# Patient Record
Sex: FEMALE | URBAN - METROPOLITAN AREA
[De-identification: names, ages, dates, MRNs, and addresses within clinical notes are randomized per-mention and may not be internally consistent; named-entity substitution may affect disease eponyms.]

---

## 2020-04-06 ENCOUNTER — VIRTUAL VISIT (OUTPATIENT)
Dept: FAMILY MEDICINE | Facility: OTHER | Age: 30
End: 2020-04-06

## 2020-04-07 NOTE — PROGRESS NOTES
"Date: 2020 15:51:46  Clinician: Dane Juares  Clinician NPI: [id number]  Patient: Belinda Castañeda  Patient : 1990  Patient Address: [address] [place] [address]  Patient Phone: [phone]  Visit Protocol: URI  Patient Summary:  Belinda is a 30 year old ( : 1990 ) female who initiated a Visit for cold, sinus infection, or influenza. When asked the question \"Please sign me up to receive news, health information and promotions. \", Belinda responded \"No\".    Belinda states her symptoms started gradually 7-9 days ago.   Her symptoms consist of rhinitis, facial pain or pressure, myalgia, a sore throat, a cough, nasal congestion, malaise, chills, diarrhea, and a headache. Belinda also feels feverish.   Symptom details     Nasal secretions: The color of her mucus is clear.    Cough: Belinda coughs a few times an hour and her cough is not more bothersome at night. Phlegm comes into her throat when she coughs. She believes her cough is caused by post-nasal drip. The color of the phlegm is clear.     Sore throat: Belinda reports having severe throat pain (7-9 on a 10 point pain scale), does not have exudate on her tonsils, and can swallow liquids. The lymph nodes in her neck are not enlarged. A rash has not appeared on the skin since the sore throat started.     Temperature: Her current temperature is 102.9 degrees Fahrenheit. Belinda has had a temperature over 100 degrees Fahrenheit for 5-7 days.     Facial pain or pressure: The facial pain or pressure does not feel worse when bending or leaning forward.     Headache: She states the headache is severe (7-9 on a 10 point pain scale).      Belinda denies having ear pain, enlarged lymph nodes, vomiting, nausea, teeth pain, and wheezing. She also denies taking antibiotic medication for the symptoms, having recent facial or sinus surgery in the past 60 days, having a sinus infection within the past year, and double sickening (worsening symptoms after initial " improvement). She is not experiencing dyspnea.   Precipitating events  Within the past week, Belinda has not been exposed to someone with strep throat. She has not recently been exposed to someone with influenza. Belinda has not been in close contact with any high risk individuals.   Pertinent COVID-19 (Coronavirus) information  Belinda has not traveled internationally or to the areas where COVID-19 (Coronavirus) is widespread, including cruise ship travel in the last 14 days before the start of her symptoms.   Belinda has not had a close contact with a laboratory-confirmed COVID-19 patient within 14 days of symptom onset. She also has not had a close contact with a suspected COVID-19 patient within 14 days of symptom onset.   Belinda does not work or volunteer as healthcare worker or a  and does not work or volunteer in a healthcare facility. She does not live with a healthcare worker.   Triage Point(s) temporarily suspended for COVID-19 (Coronavirus) screening  Belinda reported the following symptoms which were previously protocol referral points. These protocol referral points have temporarily been removed for purposes of COVID-19 (Coronavirus) screening.   Temperature &gt; 102. Current temperature: 102.9    Pertinent medical history  Belinda typically gets a yeast infection when she takes antibiotics. She has used fluconazole (Diflucan) to treat previous yeast infections. 1 dose of fluconazole (Diflucan) has typically been sufficient for symptoms to resolve in the past.   Belinda needs a return to work/school note.   Weight: 305 lbs   Belinda does not smoke or use smokeless tobacco.   She denies pregnancy and denies breastfeeding. She does not menstruate.   Weight: 305 lbs    MEDICATIONS: No current medications, ALLERGIES: NKDA  Clinician Response:  Dear Belinda,  Based on the information provided, you have acute bacterial sinusitis, also known as a sinus infection. Sinus infections are caused by bacteria or a virus and  symptoms are almost always identical. The difference between the 2 types of infections is timing.  Sinus infections start as viral infections and symptoms improve on their own in about 7 days. If symptoms have not improved after 7 days or have even worsened, a bacterial infection may have developed.  Medication information  I am prescribing:       Fluticasone 50 mcg/actuation nasal spray. Inhale 2 sprays in each nostril 1 time per day; after 1 week, may adjust to 1 - 2 sprays in each nostril 1 time per day. This medication takes several days to start working, so keep taking it even if it doesn't help right away. There are no refills with this prescription.      Amoxicillin-pot clavulanate 875-125 mg oral tablet. Take 1 tablet by mouth every 12 hours for 10 days. There are no refills with this prescription.     Because you usually get a yeast infection when taking antibiotics, I am also prescribing:     Fluconazole (Diflucan) 150 mg oral tablet. Take 1 tablet by mouth 1 time a day for 1 day. There are no refills with this prescription.   If you become pregnant during this course of treatment, stop taking the medication and contact your primary care provider.  Unless you are allergic to the over-the-counter medication(s) below, I recommend using:       Acetaminophen (Tylenol or store brand) oral tablet. Take 1-2 tablets by mouth every 4-6 hours to help with the discomfort.    A decongestant such as Sudafed PE or store brand.     Over-the-counter medications do not require a prescription. Ask the pharmacist if you have any questions.  Self care  Steps you can take to be as comfortable as possible:     Rest.    Drink plenty of fluids.    Take a warm shower to loosen congestion    Use a cool-mist humidifier.    Use throat lozenges.    Suck on frozen items such as popsicles.    Drink hot tea with lemon and honey.    Gargle with warm salt water (1/4 teaspoon of salt per 8 ounce glass of water).    Take a spoonful of honey  to reduce your cough.     When to seek care  Please be seen in a clinic or urgent care if any of the following occur:     New symptoms develop, or symptoms become worse    Symptoms do not start to improve after 3 days of treatment     Call ahead before going to the clinic or urgent care.  It is possible to have an allergic reaction to an antibiotic even if you have not had one in the past. If you notice a new rash, significant swelling, or difficulty breathing, stop taking this medication immediately and go to a clinic or urgent care.  Call 911 or go to the emergency room if you feel that your throat is closing off, you suddenly develop a rash, you are unable to swallow fluids, you are drooling, or you are having difficulty breathing.  COVID-19 (Coronavirus) General Information  With the increase in the number of COVID-19 (Coronavirus) cases, we understand you may have some questions. Below is some helpful information on COVID-19 (Coronavirus).  How can I protect myself and others from the COVID-19 (Coronavirus)?  Because there is currently no vaccine to prevent infection, the best way to protect yourself is to avoid being exposed to this virus. Put distance between yourself and other people if COVID-19 (Coronavirus) is spreading in your community. The virus is thought to spread mainly from person-to-person.     Between people who are in close contact with one another (within about 6 about) for a prolonged period (10 minutes or longer).    Through respiratory droplets produced when an infected person coughs or sneezes.     The CDC recommends the following additional steps to protect yourself and others:     Wash your hands often with soap and water for at least 20 seconds, especially after blowing your nose, coughing, or sneezing; going to the bathroom; and before eating or preparing food.  Use an alcohol-based hand  that contains at least 60 percent alcohol if soap and water are not available.        Avoid  touching your eyes, nose and mouth with unwashed hands.    Avoid close contact with people who are sick.    Stay home when you are sick.    Cover your cough or sneeze with a tissue, then throw the tissue in the trash.    Clean and disinfect frequently touched objects and surfaces.     You can help stop COVID-19 (Coronavirus) by knowing the signs and symptoms:     Fever    Cough    Shortness of breath     Contact your healthcare provider if   Develop symptoms   AND   Have been in close contact with a person known to have COVID-19 (Coronavirus) or live in or have recently traveled from an area with ongoing spread of COVID-19 (Coronavirus). Call ahead before you go to a doctor's office or emergency room. Tell them about your recent travel and your symptoms.   For the most up to date information, visit the CDC's website.  Self-monitoring  Self-monitoring means people should monitor themselves for fever by taking their temperatures twice a day and remain alert for a cough or difficulty breathing.  It is important to check your health two times each day for 14 days after a potential exposure to a person with COVID-19 (Coronavirus) or after travel from a location where COVID-19 (Coronavirus) is widespread. If you have been exposed to a person with COVID-19 (Coronavirus), it may take up to 14 days to know if you will get sick. Follow the steps below to check and record your health.     Take your temperature with a thermometer twice a day, once in the morning and once in the evening, and watch for a cough or difficulty breathing for 14 days.    Write down your temperature and any COVID-19 symptoms you may have: feeling feverish, coughing, or difficulty breathing.    Stay home from work or school.    Do not take public transportation, taxis, or ride-shares.    Avoid crowded places (such as shopping centers and movie theaters) and limit your activities in public.    Keep your distance from others (about 6 feet or 2 meters).     If you get sick with fever, cough, or trouble breathing, contact your healthcare provider and tell them about your recent travel and/or your symptoms.    If you need to seek medical care for other reasons, such as dialysis, call ahead to your doctor and tell them about your recent travel.     Steps to help prevent the spread of COVID-19 (Coronavirus) if you are sick  If you are sick with COVID-19 (Coronavirus) or suspect you are infected with the virus that causes COVID-19 (Coronavirus), follow the steps below to help prevent the disease from spreading&nbsp;to people in your home and community.     Stay home except to get medical care. Home isolation may be started in consultation with your healthcare clinician.    Separate yourself from other people and animals in your home.    Call ahead before visiting your doctor if you have a medical appointment.    Wear a facemask when you are around other people.    Cover your cough and sneezes.    Clean your hands often.    Avoid sharing personal household items.    Clean and disinfect frequently touched objects and surfaces everyday.    You will need to have someone drop off medications or household supplies (if needed) at your house without coming inside or in contact with you or others living in your house.    Monitor your symptoms and seek prompt medical care if your illness is worsening (e.g. Difficulty breathing).    Discontinue home isolation only in consultation with your healthcare provider.     For more detailed and up to date information on what to do if you are sick, visit this link: What to Do If You Are Sick With COVID-19.  Do I need to be tested for COVID-19 (Coronavirus)?     Not everyone needs to be tested for COVID-19 (Coronavirus). Decisions on which patients receive testing will be based on the local spread of COVID-19 (Coronavirus) as well as the symptoms. Your healthcare provider will make the final decision on whether you should be tested.    In the  "meantime, if you have concerns that you may have been exposed, it is reasonable to practice \"social distancing.\"&nbsp; If you are ill with a cold or flu-like illness, please monitor your symptoms and call your healthcare provider if your symptoms worsen.    For more up to date information, visit this link: COVID-19 (Coronavirus) Frequently Asked Questions and Answers.      Diagnosis: Acute bacterial sinusitis  Diagnosis ICD: J01.90  Prescription: fluticasone 50 mcg/actuation nasal spray,suspension 1 120 spray aerosol with adapter (grams), 30 days supply. Inhale 2 sprays in each nostril 1 time per day; after 1 week, may adjust to 1 - 2 sprays in each nostril 1 time per day.. Refills: 0, Refill as needed: no, Allow substitutions: yes  Prescription: amoxicillin-pot clavulanate 875-125 mg oral tablet 20 tablet, 10 days supply. Take 1 tablet by mouth every 12 hours for 10 days. Refills: 0, Refill as needed: no, Allow substitutions: yes  Prescription: fluconazole (Diflucan) 150 mg oral tablet 1 tablet, 1 days supply. Take 1 tablet by mouth 1 time per day for 1 day. Refills: 0, Refill as needed: no, Allow substitutions: yes  Pharmacy: [id number] - [phone] - [address] [place] [address]  "

## 2020-08-09 ENCOUNTER — VIRTUAL VISIT (OUTPATIENT)
Dept: FAMILY MEDICINE | Facility: OTHER | Age: 30
End: 2020-08-09

## 2020-08-10 NOTE — PROGRESS NOTES
"Date: 2020 21:29:23  Clinician: Keri Navarro  Clinician NPI: [id number]  Patient: Belinda Castañeda  Patient : 1990  Patient Address: [address] [place] [address]  Patient Phone: [phone]  Visit Protocol: URI  Patient Summary:  Belinda is a 30 year old ( : 1990 ) female who initiated a Visit for cold, sinus infection, or influenza. When asked the question \"Please sign me up to receive news, health information and promotions. \", Belinda responded \"No\".    Belinda states her symptoms started suddenly 10-13 days ago. After her symptoms started, they improved and then got worse again.   Her symptoms consist of a headache, myalgia, facial pain or pressure, and malaise.   Symptom details     Facial pain or pressure: The facial pain or pressure does not feel worse when bending or leaning forward.     Headache: She states the headache is mild (1-3 on a 10 point pain scale).      Belinda denies having ear pain, chills, enlarged lymph nodes, nausea, sore throat, teeth pain, ageusia, diarrhea, cough, nasal congestion, vomiting, rhinitis, anosmia, fever, and wheezing. She also denies having a sinus infection within the past year, taking antibiotic medication in the past month, and having recent facial or sinus surgery in the past 60 days. She is not experiencing dyspnea.   Precipitating events  She has not recently been exposed to someone with influenza. Belinda has not been in close contact with any high risk individuals.   Pertinent COVID-19 (Coronavirus) information  In the past 14 days, Belinda has not worked in a congregate living setting.   She does not work or volunteer as healthcare worker or a  and does not work or volunteer in a healthcare facility.   Belinda also has not lived in a congregate living setting in the past 14 days. She does not live with a healthcare worker.   Belinda has not had a close contact with a laboratory-confirmed COVID-19 patient within 14 days of symptom onset.   " Since December 2019, Belinda and has not had upper respiratory infection or influenza-like illness. Has not been diagnosed with lab-confirmed COVID-19 test   Pertinent medical history  Belinda does not get yeast infections when she takes antibiotics.   Belinda needs a return to work/school note.   Weight: 305 lbs   Belinda does not smoke or use smokeless tobacco.   She denies pregnancy and denies breastfeeding. She does not menstruate.   Weight: 305 lbs    MEDICATIONS: No current medications, ALLERGIES: NKDA  Clinician Response:  Dear Belinda,   Your symptoms show that you may have coronavirus (COVID-19). This illness can cause fever, cough and trouble breathing. Many people get a mild case and get better on their own. Some people can get very sick.  What should I do?  We would like to test you for this virus.   1. Please call [phone] to schedule your visit. Explain that you were referred by [facility] to have a COVID-19 test. Be [facility].  The following will serve as your written order for this COVID Test, ordered by me, for the indication of suspected COVID [Z20.828]: The test will be ordered in [facility] [facility], our electronic health record, after you are scheduled. It will show as ordered and authorized by Albert Silva MD.  Order: COVID-19 (Coronavirus) PCR for SYMPTOMATIC testing from [facility].      2. When it's time for your COVID test:  Stay at least 6 feet away from others. (If someone will drive you to your test, stay in the backseat, as far away from the  as you can.)   Cover your mouth and nose with a mask, tissue or washcloth.  Go straight to the testing site. Don't make any stops on the way there or back.      3.Starting now: Stay home and away from others (self-isolate) until:   You've had no fever---and no medicine that reduces fever---for one full day (24 hours). And...   Your other symptoms have gotten better. For example, your cough or breathing has improved. And...   At least 10 days  "have passed since your symptoms started.       During this time, don't leave the house except for testing or medical care.   Stay in your own room, even for meals. Use your own bathroom if you can.   Stay away from others in your home. No hugging, kissing or shaking hands. No visitors.  Don't go to work, school or anywhere else.    Clean \"high touch\" surfaces often (doorknobs, counters, handles, etc.). Use a household cleaning spray or wipes. You'll find a full list of  on the EPA website: www.epa.gov/pesticide-registration/list-n-disinfectants-use-against-sars-cov-2.   Cover your mouth and nose with a mask, tissue or washcloth to avoid spreading germs.  Wash your hands and face often. Use soap and water.  Caregivers in these groups are at risk for severe illness due to COVID-19:  o People 65 years and older  o People who live in a nursing home or long-term care facility  o People with chronic disease (lung, heart, cancer, diabetes, kidney, liver, immunologic)  o People who have a weakened immune system, including those who:   Are in cancer treatment  Take medicine that weakens the immune system, such as corticosteroids  Had a bone marrow or organ transplant  Have an immune deficiency  Have poorly controlled HIV or AIDS  Are obese (body mass index of 40 or higher)  Smoke regularly   o Caregivers should wear gloves while washing dishes, handling laundry and cleaning bedrooms and bathrooms.  o Use caution when washing and drying laundry: Don't shake dirty laundry, and use the warmest water setting that you can.  o For more tips, go to www.cdc.gov/coronavirus/2019-ncov/downloads/10Things.pdf.    4.Sign up for [employer]. We know it's scary to hear that you might have COVID-19. We want to track your symptoms to make sure you're okay over the next 2 weeks. Please look for an email from Cornelio Arriola---this is a free, online program that we'll use to keep in touch. To sign up, follow the link in the email. Learn " more at [email or web address]  How can I take care of myself?   Get lots of rest. Drink extra fluids (unless a doctor has told you not to).   Take Tylenol (acetaminophen) for fever or pain. If you have liver or kidney problems, ask your family doctor if it's okay to take Tylenol.   Adults can take either:    650 mg (two 325 mg pills) every 4 to 6 hours, or...   1,000 mg (two 500 mg pills) every 8 hours as needed.    Note: Don't take more than 3,000 mg in one day. Acetaminophen is found in many medicines (both prescribed and over-the-counter medicines). Read all labels to be sure you don't take too much.   For children, check the Tylenol bottle for the right dose. The dose is based on the child's age or weight.    If you have other health problems (like cancer, heart failure, an organ transplant or severe kidney disease): Call your specialty clinic if you don't feel better in the next 2 days.       Know when to call 911. Emergency warning signs include:    Trouble breathing or shortness of breath Pain or pressure in the chest that doesn't go away Feeling confused like you haven't felt before, or not being able to wake up Bluish-colored lips or face.  Where can I get more information?   [facility] -- About COVID-19: [email or web address]   CDC -- What to Do If You're Sick: www.cdc.gov/coronavirus/2019-ncov/about/steps-when-sick.html   CDC -- Ending Home Isolation: www.cdc.gov/coronavirus/2019-ncov/hcp/disposition-in-home-patients.html   CDC -- Caring for Someone: www.cdc.gov/coronavirus/2019-ncov/if-you-are-sick/care-for-someone.html   [facility] -- Interim Guidance for Hospital Discharge to Home: www.health.[facility].[place]/diseases/coronavirus/hcp/hospdischarge.pdf   [facility] clinical trials (COVID-19 research studies): clinicalaffairs.[facility]/n-clinical-trials    Below are the COVID-19 hotlines at the [place] Department of [facility] ([facility]). Interpreters are available.    [facility]  questions: Call 248-343-7398 or 1-752.350.8928 (7 a.m. to 7 p.m.) For questions about schools and childcare: Call 023-725-4386 or 1-178.421.3596 (7 a.m. to 7 p.m.)    Diagnosis: Acute upper respiratory infection, unspecified  Diagnosis ICD: J06.9

## 2020-09-07 ENCOUNTER — VIRTUAL VISIT (OUTPATIENT)
Dept: FAMILY MEDICINE | Facility: OTHER | Age: 30
End: 2020-09-07

## 2020-09-08 ENCOUNTER — VIRTUAL VISIT (OUTPATIENT)
Dept: FAMILY MEDICINE | Facility: OTHER | Age: 30
End: 2020-09-08

## 2020-09-08 NOTE — PROGRESS NOTES
"Date: 2020 18:35:12  Clinician: Didier Mims  Clinician NPI: [id number]  Patient: Belinda Castaeñda  Patient : 1957  Patient Address: [address] [place], [place] [address]  Patient Phone: [phone]  Visit Protocol: URI  Patient Summary:  Belinda is a 62 year old ( : 1957 ) female who initiated a Visit for cold, sinus infection, or influenza. When asked the question \"Please sign me up to receive news, health information and promotions. \", Belinda responded \"No\".    Belinda states her symptoms started suddenly 7-9 days ago.   Her symptoms consist of facial pain or pressure, nasal congestion, a headache, malaise, and myalgia. Belinda also feels feverish.   Symptom details     Nasal secretions: The color of her mucus is clear.    Temperature: Her current temperature is 101 degrees Fahrenheit. Belinda has had a temperature over 100 degrees Fahrenheit for 1-2 days.     Facial pain or pressure: The facial pain or pressure feels worse when bending over or leaning forward.     Headache: She states the headache is moderate (4-6 on a 10 point pain scale).      Belinda denies having ear pain, anosmia, vomiting, rhinitis, nausea, wheezing, sore throat, teeth pain, ageusia, diarrhea, cough, chills, and enlarged lymph nodes. She also denies taking antibiotic medication in the past month, having recent facial or sinus surgery in the past 60 days, double sickening (worsening symptoms after initial improvement), and having a sinus infection within the past year. She is not experiencing dyspnea.   Precipitating events  She has not recently been exposed to someone with influenza. Belinda has not been in close contact with any high risk individuals.   Pertinent COVID-19 (Coronavirus) information  In the past 14 days, Belinda has not worked in a congregate living setting.   She does not work or volunteer as healthcare worker or a  and does not work or volunteer in a healthcare facility.   Belinda also has not lived in a congregate " living setting in the past 14 days. She does not live with a healthcare worker.   Belinda has not had a close contact with a laboratory-confirmed COVID-19 patient within 14 days of symptom onset.   Since December 2019, Belinda and has not had upper respiratory infection or influenza-like illness. Has not been diagnosed with lab-confirmed COVID-19 test   Pertinent medical history  Belinda does not get yeast infections when she takes antibiotics.   Belinda does not need a return to work/school note.   Weight: 280 lbs   Belinda does not smoke or use smokeless tobacco.   Weight: 280 lbs    MEDICATIONS: No current medications, ALLERGIES: NKDA  Clinician Response:  Dear Belinda,   Your symptoms show that you may have coronavirus (COVID-19). This illness can cause fever, cough and trouble breathing. Many people get a mild case and get better on their own. Some people can get very sick.  What should I do?  We would like to test you for this virus.   1. Please call [phone] to schedule your visit. Explain that you were referred by [facility] to have a COVID-19 test. Be ready to share your [facility] visit ID number.  The following will serve as your written order for this COVID Test, ordered by me, for the indication of suspected COVID [Z20.828]: The test will be ordered in [employer], our electronic health record, after you are scheduled. It will show as ordered and authorized by Albert Silva MD.  Order: COVID-19 (Coronavirus) PCR for SYMPTOMATIC testing from [facility].      2. When it's time for your COVID test:  Stay at least 6 feet away from others. (If someone will drive you to your test, stay in the backseat, as far away from the  as you can.)   Cover your mouth and nose with a mask, tissue or washcloth.  Go straight to the testing site. Don't make any stops on the way there or back.      3.Starting now: Stay home and away from others (self-isolate) until:   You've had no fever---and no medicine that reduces fever---for one full day (24 hours).  "And...   Your other symptoms have gotten better. For example, your cough or breathing has improved. And...   At least 10 days have passed since your symptoms started.       During this time, don't leave the house except for testing or medical care.   Stay in your own room, even for meals. Use your own bathroom if you can.   Stay away from others in your home. No hugging, kissing or shaking hands. No visitors.  Don't go to work, school or anywhere else.    Clean \"high touch\" surfaces often (doorknobs, counters, handles, etc.). Use a household cleaning spray or wipes. You'll find a full list of  on the EPA website: www.epa.gov/pesticide-registration/list-n-disinfectants-use-against-sars-cov-2.   Cover your mouth and nose with a mask, tissue or washcloth to avoid spreading germs.  Wash your hands and face often. Use soap and water.  Caregivers in these groups are at risk for severe illness due to COVID-19:  o People 65 years and older  o People who live in a nursing home or long-term care facility  o People with chronic disease (lung, heart, cancer, diabetes, kidney, liver, immunologic)  o People who have a weakened immune system, including those who:   Are in cancer treatment  Take medicine that weakens the immune system, such as corticosteroids  Had a bone marrow or organ transplant  Have an immune deficiency  Have poorly controlled HIV or AIDS  Are obese (body mass index of 40 or higher)  Smoke regularly   o Caregivers should wear gloves while washing dishes, handling laundry and cleaning bedrooms and bathrooms.  o Use caution when washing and drying laundry: Don't shake dirty laundry, and use the warmest water setting that you can.  o For more tips, go to www.cdc.gov/coronavirus/2019-ncov/downloads/10Things.pdf.    4.Sign up for GetWell Loop. We know it's scary to hear that you might have COVID-19. We want to track your symptoms to make sure you're okay over the next 2 weeks. Please look for an email from " Cornelio Arriola---this is a free, online program that we'll use to keep in touch. To sign up, follow the link in the email. Learn more at [email or web address]  How can I take care of myself?   Get lots of rest. Drink extra fluids (unless a doctor has told you not to).   Take Tylenol (acetaminophen) for fever or pain. If you have liver or kidney problems, ask your family doctor if it's okay to take Tylenol.   Adults can take either:    650 mg (two 325 mg pills) every 4 to 6 hours, or...   1,000 mg (two 500 mg pills) every 8 hours as needed.    Note: Don't take more than 3,000 mg in one day. Acetaminophen is found in many medicines (both prescribed and over-the-counter medicines). Read all labels to be sure you don't take too much.   For children, check the Tylenol bottle for the right dose. The dose is based on the child's age or weight.    If you have other health problems (like cancer, heart failure, an organ transplant or severe kidney disease): Call your specialty clinic if you don't feel better in the next 2 days.       Know when to call 911. Emergency warning signs include:    Trouble breathing or shortness of breath Pain or pressure in the chest that doesn't go away Feeling confused like you haven't felt before, or not being able to wake up Bluish-colored lips or face.  Where can I get more information?   [facility] -- About COVID-19: [email or web address]   CDC -- What to Do If You're Sick: www.cdc.gov/coronavirus/2019-ncov/about/steps-when-sick.html   CDC -- Ending Home Isolation: www.cdc.gov/coronavirus/2019-ncov/hcp/disposition-in-home-patients.html   CDC -- Caring for Someone: www.cdc.gov/coronavirus/2019-ncov/if-you-are-sick/care-for-someone.html   [facility] -- Interim Guidance for Hospital Discharge to Home: www.health.[facility].mn.us/diseases/coronavirus/hcp/hospdischarge.pdf   [facility] clinical trials (COVID-19 research studies):  clinicalaffairs.[facility]-clinical-trials    Below are the COVID-19 hotlines at the [place] Department of Health ([facility]). Interpreters are available.    For health questions: Call 390-011-0732 or 1-583.880.1593 (7 a.m. to 7 p.m.) For questions about schools and childcare: Call 935-650-6998 or 1-842.132.2776 (7 a.m. to 7 p.m.)    Diagnosis: Acute upper respiratory infection, unspecified  Diagnosis ICD: J06.9  Additional Clinician Notes:   If your symptoms are not improving or worsen, please go to one of our urgent care locations for evaluation.

## 2020-09-08 NOTE — PROGRESS NOTES
"Date: 2020 21:43:13  Clinician: José Schulz  Clinician NPI: [id number]  Patient: Belinda Castañeda  Patient : 1990  Patient Address: [address] [place] [address]  Patient Phone: [phone]  Visit Protocol: URI  Patient Summary:  Belinda is a 30 year old ( : 1990 ) female who initiated a Visit for cold, sinus infection, or influenza. When asked the question \"Please sign me up to receive news, health information and promotions. \", Belinda responded \"No\".    Belinda states her symptoms started gradually 7-9 days ago.   Her symptoms consist of facial pain or pressure, a cough, nasal congestion, a headache, malaise, and myalgia. Belinda also feels feverish.   Symptom details     Nasal secretions: The color of her mucus is clear.    Cough: Belinda coughs a few times an hour and her cough is more bothersome at night. Phlegm does not come into her throat when she coughs. She does not believe her cough is caused by post-nasal drip.     Temperature: Her current temperature is 100 degrees Fahrenheit.     Facial pain or pressure: The facial pain or pressure does not feel worse when bending or leaning forward.     Headache: She states the headache is moderate (4-6 on a 10 point pain scale).      Belinda denies having ear pain, anosmia, vomiting, rhinitis, nausea, wheezing, sore throat, teeth pain, ageusia, diarrhea, chills, and enlarged lymph nodes. She also denies taking antibiotic medication in the past month, having recent facial or sinus surgery in the past 60 days, double sickening (worsening symptoms after initial improvement), and having a sinus infection within the past year. She is not experiencing dyspnea.   Precipitating events  She has not recently been exposed to someone with influenza. Belinda has not been in close contact with any high risk individuals.   Pertinent COVID-19 (Coronavirus) information  In the past 14 days, Belinda has not worked in a congregate living setting.   She does not work or " volunteer as healthcare worker or a  and does not work or volunteer in a healthcare facility.   Belinda also has not lived in a congregate living setting in the past 14 days. She does not live with a healthcare worker.   Belinda has not had a close contact with a laboratory-confirmed COVID-19 patient within 14 days of symptom onset.   Since December 2019, Belinda and has not had upper respiratory infection or influenza-like illness. Has not been diagnosed with lab-confirmed COVID-19 test   Pertinent medical history  Belinda does not get yeast infections when she takes antibiotics.   Belinda does not need a return to work/school note.   Weight: 280 lbs   Belinda does not smoke or use smokeless tobacco.   She denies pregnancy and denies breastfeeding. She does not menstruate.   Weight: 280 lbs    MEDICATIONS: No current medications, ALLERGIES: NKDA  Clinician Response:  Dear Belinda,  Based on the information provided, you have acute bacterial sinusitis, also known as a sinus infection. Sinus infections are caused by bacteria or a virus and symptoms are almost always identical. The difference between the 2 types of infections is timing.  Sinus infections start as viral infections and symptoms improve on their own in about 7 days. If symptoms have not improved after 7 days or have even worsened, a bacterial infection may have developed.  Medication information  I am prescribing:       Fluticasone 50 mcg/actuation nasal spray. Inhale 2 sprays in each nostril 1 time per day; after 1 week, may adjust to 1 - 2 sprays in each nostril 1 time per day. This medication takes several days to start working, so keep taking it even if it doesn't help right away. There are no refills with this prescription.      Ibuprofen 800 mg oral tablet. Take 1 tablet by mouth 3 times per day as needed for 7 days. Do not exceed 2400mg in 24 hours. There are no refills with this prescription.      Doxycycline hyclate 100 mg oral tablet. Take 1 tablet  by mouth 2 times per day for 7 days. There are no refills with this prescription.      Benzonatate (Tessalon Perles) 100 mg oral capsule. Take 1-2 capsules by mouth 3 times per day as needed for your cough. There are no refills with this prescription.     Certain antibiotics such as Doxycycline, levofloxacin, and moxifloxacin can cause your skin to be more sensitive to the sun. Exposure to the sun when taking these antibiotics may cause skin rash, itching, redness, or a severe sunburn. Be sure to avoid prolonged or direct exposure to the sun, especially between 10 am and 3 pm, if possible. Wear protective clothing and use sunscreen of SPF 30 or higher when you are outdoors.  Yeast infections can be a common side effect of antibiotics. The most common symptom of a yeast infection is itchiness in and around the vagina. Other signs and symptoms include burning, redness, or a thick, white vaginal discharge that looks like cottage cheese and does not have a bad smell.  If you become pregnant during this course of treatment, stop taking the medication and contact your primary care provider.  Unless you are allergic to the over-the-counter medication(s) below, I recommend using:       Acetaminophen (Tylenol or store brand) oral tablet. Take 1-2 tablets by mouth every 4-6 hours to help with the discomfort.      Ibuprofen (Advil or store brand) 200 mg oral tablet. Take 1-3 tablets (200-600 mg) by mouth every 8 hours to help with the discomfort. Make sure to take the ibuprofen with food. Do not exceed 2400 mg in 24 hours.      Guaifenesin + dextromethorphan (Robitussin DM, Mucinex DM, or store brand).    A sinus irrigation kit such as Sinus Rinse, Neti Pot, SinuCleanse, or store brand. Be sure to use sterile or previously boiled water to prevent unwanted infections.     Over-the-counter medications do not require a prescription. Ask the pharmacist if you have any questions.  Self care  Steps you can take to be as comfortable  as possible:     Rest.    Drink plenty of fluids.    Take a warm shower to loosen congestion    Use a cool-mist humidifier.    Take a spoonful of honey to reduce your cough.     When to seek care  Please be seen in a clinic or urgent care if any of the following occur:     New symptoms develop, or symptoms become worse    Symptoms do not start to improve after 3 days of treatment     Call ahead before going to the clinic or urgent care.  It is possible to have an allergic reaction to an antibiotic even if you have not had one in the past. If you notice a new rash, significant swelling, or difficulty breathing, stop taking this medication immediately and go to a clinic or urgent care.  Additional treatment plan   Your symptoms show that you may have coronavirus (COVID-19). This illness can cause fever, cough and trouble breathing. Many people get a mild case and get better on their own. Some people can get very sick.  What should I do?  We would like to test you for this virus.   1. Please call [phone] to schedule your visit. [facility]ain [facility]. Be ready to share [facility].  The following will serve as your written order for this COVID Test, ordered by me, for the indication of suspected COVID [Z20.828]: The test will be ordered in [employer], our electronic health record, after you are scheduled. It will show as ordered and authorized by Albert Silva MD.  Order: COVID-19 (Coronavirus) PCR for SYMPTOMATIC testing from [facility].      2. When it's time for your COVID test:  Stay at least 6 feet away from others. (If someone will drive you to your test, stay in the backseat, as far away from the  as you can.)   Cover your mouth and nose with a mask, tissue or washcloth.  Go straight to the testing site. Don't make any stops on the way there or back.      3.Starting now: Stay home and away from others (self-isolate) until:   You've had no fever---and no  "medicine that reduces fever---for one full day (24 hours). And...   Your other symptoms have gotten better. For example, your cough or breathing has improved. And...   At least 10 days have passed since your symptoms started.       During this time, don't leave the house except for testing or medical care.   Stay in your own room, even for meals. Use your own bathroom if you can.   Stay away from others in your home. No hugging, kissing or shaking hands. No visitors.  Don't go to work, school or anywhere else.    Clean \"high touch\" surfaces often (doorknobs, counters, handles, etc.). Use a household cleaning spray or wipes. You'll find a full list of  on the EPA website: www.epa.gov/pesticide-registration/list-n-disinfectants-use-against-sars-cov-2.   Cover your mouth and nose with a mask, tissue or washcloth to avoid spreading germs.  Wash your hands and face often. Use soap and water.  Caregivers in these groups are at risk for severe illness due to COVID-19:  o People 65 years and older  o People who live in a nursing home or long-term care facility  o People with chronic disease (lung, heart, cancer, diabetes, kidney, liver, immunologic)  o People who have a weakened immune system, including those who:   Are in cancer treatment  Take medicine that weakens the immune system, such as corticosteroids  Had a bone marrow or organ transplant  Have an immune deficiency  Have poorly controlled HIV or AIDS  Are obese (body mass index of 40 or higher)  Smoke regularly   o Caregivers should wear gloves while washing dishes, handling laundry and cleaning bedrooms and bathrooms.  o Use caution when washing and drying laundry: Don't shake dirty laundry, and use the warmest water setting that you can.  o For more tips, go to www.cdc.gov/coronavirus/2019-ncov/downloads/10Things.pdf.    4.Sign up for GetWell Loop. We know it's scary to hear that you might have COVID-19. We want to track your symptoms to make sure you're " okay over the next 2 weeks. Please look for an email from [employer]---this is a free, online program that we'll use to keep in touch. To sign up, follow the link in the email. Learn more at [email or web address]  How can I take care of myself?   Get lots of rest. Drink extra fluids (unless a doctor has told you not to).   Take Tylenol (acetaminophen) for fever or pain. If you have liver or kidney problems, ask your family doctor if it's okay to take Tylenol.   Adults can take either:    650 mg (two 325 mg pills) every 4 to 6 hours, or...   1,000 mg (two 500 mg pills) every 8 hours as needed.    Note: Don't take more than 3,000 mg in one day. Acetaminophen is found in many medicines (both prescribed and over-the-counter medicines). Read all labels to be sure you don't take too much.   For children, check the Tylenol bottle for the right dose. The dose is based on the child's age or weight.    If you have other health problems (like cancer, heart failure, an organ transplant or severe kidney disease): Call your specialty clinic if you don't feel better in the next 2 days.       Know when to call 911. Emergency warning signs include:    Trouble breathing or shortness of breath Pain or pressure in the chest that doesn't go away Feeling confused like you haven't felt before, or not being able to wake up Bluish-colored lips or face.  Where can I get more information?   [facility] -- About COVID-19: [email or web address]   CDC -- What to Do If You're Sick: www.cdc.gov/coronavirus/2019-ncov/about/steps-when-sick.html   CDC -- Ending Home Isolation: www.cdc.gov/coronavirus/2019-ncov/hcp/disposition-in-home-patients.html   CDC -- Caring for Someone: www.cdc.gov/coronavirus/2019-ncov/if-you-are-sick/care-for-someone.html   [facility] -- Interim Guidance for Hospital Discharge to Home: www.health.[facility].mn./diseases/coronavirus/hcp/hospdischarge.pdf   [facility] clinical trials (COVID-19  research studies): clinicalaffairs.[facility]/n-clinical-trials    Below are the COVID-19 hotlines at the [place] Department of Health ([facility]). Interpreters are available.    For health questions: Call 404-281-6067 or 1-346.260.3073 (7 a.m. to 7 p.m.) For questions about schools and childcare: Call 462-437-6077 or 1-317.542.4732 (7 a.m. to 7 p.m.)    Diagnosis: COVID-19  Diagnosis ICD: U07.1  Prescription: fluticasone 50 mcg/actuation nasal spray,suspension 1 120 spray aerosol with adapter (grams), 30 days supply. Inhale 2 sprays in each nostril 1 time per day; after 1 week, may adjust to 1 - 2 sprays in each nostril 1 time per day.. Refills: 0, Refill as needed: no, Allow substitutions: yes  Prescription: ibuprofen (IBU) 800 mg oral tablet 21 tablet, 7 days supply. Take 1 tablet by mouth 3 times per day as needed for 7 days. Refills: 0, Refill as needed: no, Allow substitutions: yes  Prescription: benzonatate (Tessalon Perles) 100 mg oral capsule 30 capsule, 5 days supply. Take 1-2 capsules by mouth 3 times per day as needed. Refills: 0, Refill as needed: no, Allow substitutions: yes  Prescription: doxycycline hyclate 100 mg oral tablet 14 tablet, 7 days supply. Take 1 tablet by mouth 2 times per day for 7 days. Refills: 0, Refill as needed: no, Allow substitutions: yes  Pharmacy: [id number] - [phone] - [address] [place] [address]

## 2020-12-26 ENCOUNTER — VIRTUAL VISIT (OUTPATIENT)
Dept: FAMILY MEDICINE | Facility: OTHER | Age: 30
End: 2020-12-26

## 2020-12-27 NOTE — PROGRESS NOTES
"Date: 2020 18:31:09  Clinician: Bibiana Cerna  Clinician NPI: [id number]  Patient: Belinda Castañeda  Patient : 1957  Patient Address: [address] [place], [place] [address]  Patient Phone: [phone]  Visit Protocol: URI  Patient Summary:  Belinda is a 63 year old ( : 1957 ) female who initiated a [facility] Visit for cold, sinus infection, or influenza. When asked the question \"Please sign me up to receive news, health information and promotions. \", Belinda responded \"No\".    Belinda states her symptoms started today.   Her symptoms consist of facial pain or pressure, myalgia, a headache, nausea, chills, and malaise.   Symptom details     Facial pain or pressure: The facial pain or pressure feels worse when bending over or leaning forward.     Headache: She states the headache is severe (7-9 on a 10 point pain scale).      Belinda denies having diarrhea, anosmia, ear pain, wheezing, fever, cough, nasal congestion, sore throat, teeth pain, ageusia, vomiting, and rhinitis. She also denies having recent facial or sinus surgery in the past 60 days and taking antibiotic medication in the past month. She is not experiencing dyspnea.   Precipitating events  She has recently been exposed to someone with influenza. Belinda has not been in close contact with any high risk individuals.   Pertinent COVID-19 (Coronavirus) information  Belinda does not work or volunteer as healthcare worker or a . In the past 14 days, Belinda has not worked or volunteered at a healthcare facility or group living setting.   In the past 14 days, she also has not lived in a congregate living setting.   Belinda has not had a close contact with a laboratory-confirmed COVID-19 patient within 14 days of symptom onset.    Belinda has not been tested for COVID-19.   Pertinent medical history  She has not been told by her provider to avoid NSAIDs.   Belinda does not get yeast infections when she takes antibiotics.   Belinda does not have diabetes. She denies " having immunosuppressive conditions (e.g., chemotherapy, HIV, organ transplant, long-term use of steroids or other immunosuppressive medications, splenectomy). She denies having congestive heart failure and severe COPD. She does not have asthma.   Belinda needs a return to work/school note.   Belinda does not smoke or use smokeless tobacco.   Additional information as reported by the patient (free text): Body aches, sneezing   Weight: 305 lbs    MEDICATIONS: No current medications, ALLERGIES: NKDA  Clinician Response:  Dear Belinda,  Based on the information provided, you have viral sinusitis, also known as a sinus infection. Sinus infections are caused by bacteria or a virus and symptoms are almost always identical. The difference between the 2 types of infections is timing.  Sinus infections start as viral infections and symptoms improve on their own in about 7 days. If symptoms have not improved after 7 days or have even worsened, a bacterial infection may have developed.  Medication information  Because you have a viral infection, antibiotics will not help you get better. Treating a viral infection with antibiotics could actually make you feel worse.  I am prescribing:     Fluticasone 50 mcg/actuation nasal spray. Inhale 2 sprays in each nostril 1 time per day; after 1 week, may adjust to 1 - 2 sprays in each nostril 1 time per day. This medication takes several days to start working, so keep taking it even if it doesn't help right away. There are no refills with this prescription.   Unless you are allergic to the over-the-counter medication(s) below, I recommend using:   A decongestant such as Sudafed PE or store brand.   Over-the-counter medications do not require a prescription. Ask the pharmacist if you have any questions.  Self care  Steps you can take to be as comfortable as possible:     Rest.    Drink plenty of fluids.     When to seek care  Please be seen in a clinic or urgent care if any of the following occur:    New symptoms develop, or symptoms become worse   Call ahead before going to the clinic or urgent care.  Additional treatment plan   Your symptoms show that you may have coronavirus (COVID-19). This illness can cause fever, cough and trouble breathing. Many people get a mild case and get better on their own. Some people can get very sick.  Based on the symptoms you have shared, I would like you to be re-checked in 2 to 3 days. Please call your family clinic to set up a video or phone visit.  Will I be tested for COVID-19?  We would like to test you for this virus.   Please call [phone] to schedule your visit. Explain that you were referred by [facility] to have a COVID-19 test. Be ready to share your [facility] visit ID number.   * If you need to schedule in [place] or [facility] please call [phone] or for [facility] employees please call [phone].    The following will serve as your written order for this COVID Test, ordered by me, for the indication of suspected COVID [Z20.828]: The test will be ordered in [employer], our electronic health record, after you are scheduled. It will show as ordered and authorized by Albert Silva MD.  Order: COVID-19 (Coronavirus) PCR for SYMPTOMATIC testing from [facility].   1.When it's time for your COVID test:   Stay at least 6 feet away from others. (If someone will drive you to your test, stay in the backseat, as far away from the  as you can.)   Cover your mouth and nose with a mask, tissue or washcloth.  Go straight to the testing site. Don't make any stops on the way there or back.      2.Starting now: Stay home and away from others (self-isolate) until:   You've had no fever---and no medicine that reduces fever---for one full day (24 hours). And...   Your other symptoms have gotten better. For example, your cough or breathing has improved. And...   At least 10 days have passed since your symptoms started.       During this time, don't leave the house except for testing  "or medical care.   Stay in your own room, even for meals. Use your own bathroom if you can.   Stay away from others in your home. No hugging, kissing or shaking hands. No visitors.  Don't go to work, school or anywhere else.    Clean \"high touch\" surfaces often (doorknobs, counters, handles, etc.). Use a household cleaning spray or wipes. You'll find a full list of  on the EPA website: www.epa.gov/pesticide-registration/list-n-disinfectants-use-against-sars-cov-2.   Cover your mouth and nose with a mask, tissue or washcloth to avoid spreading germs.  Wash your hands and face often. Use soap and water.  Caregivers in these groups are at risk for severe illness due to COVID-19:  o People 65 years and older  o People who live in a nursing home or long-term care facility  o People with chronic disease (lung, heart, cancer, diabetes, kidney, liver, immunologic)   o People who have a weakened immune system, including those who:   Are in cancer treatment  Take medicine that weakens the immune system, such as corticosteroids  Had a bone marrow or organ transplant  Have an immune deficiency  Have poorly controlled HIV or AIDS  Are obese (body mass index of 40 or higher)  Smoke regularly   o Caregivers should wear gloves while washing dishes, handling laundry and cleaning bedrooms and bathrooms.  o Use caution when washing and drying laundry: Don't shake dirty laundry, and use the warmest water setting that you can.  o For more tips, go to www.cdc.gov/coronavirus/2019-ncov/downloads/10Things.pdf.      How can I take care of myself?   Get lots of rest. Drink extra fluids (unless a doctor has told you not to)   Take Tylenol (acetaminophen) for fever or pain. If you have liver or kidney problems, ask your family doctor if it's okay to take Tylenol.   Adults can take either:    650 mg (two 325 mg pills) every 4 to 6 hours, or...   1,000 mg (two 500 mg pills) every 8 hours as needed.    Note: Don't take more than 3,000 mg " in one day. Acetaminophen is found in many medicines (both prescribed and over-the-counter medicines). Read all labels to be sure you don't take too much.   For children, check the Tylenol bottle for the right dose. The dose is based on the child's age or weight.    If you have other health problems (like cancer, heart failure, an organ transplant or severe kidney disease): Call your specialty clinic if you don't feel better in the next 2 days.       Know when to call 911. Emergency warning signs include:    Trouble breathing or shortness of breath Pain or pressure in the chest that doesn't go away Feeling confused like you haven't felt before, or not being able to wake up Bluish-colored lips or face  Where can I get more information?   [facility] -- About COVID-19: [email or web address]   CDC -- What to Do If You're Sick: www.cdc.gov/coronavirus/2019-ncov/about/steps-when-sick.html   CDC -- Ending Home Isolation: www.cdc.gov/coronavirus/2019-ncov/hcp/disposition-in-home-patients.html   CDC -- Caring for Someone: www.cdc.gov/coronavirus/2019-ncov/if-you-are-sick/care-for-someone.html   [facility] -- Interim Guidance for Hospital Discharge to Home: www.health.[facility].mn.us/diseases/coronavirus/hcp/hospdischarge.pdf   [facility] clinical trials (COVID-19 research studies): clinicalaffairs.[facility]-clinical-trials    Below are the COVID-19 hotlines at the [facility] ([facility]). Interpreters are available.    For health questions: Call 344-282-8089 or 1-899.309.6126 (7 a.m. to 7 p.m.) For questions about schools and childcare: Call 660-037-0292 or 1-267.854.1229 (7 a.m. to 7 p.m.)       Diagnosis: Contact with and (suspected) exposure to other viral communicable diseases  Diagnosis ICD: Z20.828  Prescription: fluticasone 50 mcg/actuation nasal spray,suspension 1 120 spray aerosol with adapter (grams), 30 days supply. Inhale 2 sprays in each nostril 1 time per day; after 1 week,  may adjust to 1 - 2 sprays in each nostril 1 time per day.. Refills: 0, Refill as needed: no, Allow substitutions: yes

## 2021-01-19 ENCOUNTER — VIRTUAL VISIT (OUTPATIENT)
Dept: FAMILY MEDICINE | Facility: OTHER | Age: 31
End: 2021-01-19

## 2021-01-19 NOTE — PROGRESS NOTES
"Date: 2021 16:39:43  Clinician: Ivy Urbina  Clinician NPI: [id number]  Patient: Belinda Castañeda  Patient : 1957  Patient Address: [address] [place] [address]  Patient Phone: [phone]  Visit Protocol: URI  Patient Summary:  Belinda is a 63 year old ( : 1957 ) female who initiated a [facility] Visit for cold, sinus infection, or influenza. When asked the question \"Please sign me up to receive news, health information and promotions. \", Belinda responded \"No\".    Belinda states her symptoms started 1-2 days ago.   Her symptoms consist of facial pain or pressure, myalgia, rhinitis, malaise, a cough, and a headache.   Symptom details     Nasal secretions: The color of her mucus is clear.    Cough: Belinda coughs a few times an hour and her cough is more bothersome at night. Phlegm comes into her throat when she coughs. She believes her cough is caused by post-nasal drip. The color of the phlegm is clear.     Facial pain or pressure: The facial pain or pressure does not feel worse when bending or leaning forward.     Headache: She states the headache is moderate (4-6 on a 10 point pain scale).      Belinda denies having sore throat, teeth pain, ageusia, diarrhea, wheezing, fever, nasal congestion, nausea, anosmia, ear pain, chills, and vomiting. She also denies having recent facial or sinus surgery in the past 60 days and taking antibiotic medication in the past month. She is not experiencing dyspnea.   Precipitating events  She has not recently been exposed to someone with influenza. Belinda has not been in close contact with any high risk individuals.   Pertinent COVID-19 (Coronavirus) information  Belinda does not work or volunteer as healthcare worker or a . In the past 14 days, Belinda has not worked or volunteered at a healthcare facility or group living setting.   In the past 14 days, she also has not lived in a congregate living setting.   Belinda has not had a close contact with a " laboratory-confirmed COVID-19 patient within 14 days of symptom onset.    Belinda has not been tested for COVID-19.   Belinda has not received a COVID-19 vaccine.   Pertinent medical history  She has not been told by her provider to avoid NSAIDs.   Belinda does not get yeast infections when she takes antibiotics.   Belinda does not have diabetes. She denies having immunosuppressive conditions (e.g., chemotherapy, HIV, organ transplant, long-term use of steroids or other immunosuppressive medications, splenectomy). She denies having congestive heart failure and severe COPD. She does not have asthma.   Belinda needs a return to work/school note.   Belinda does not smoke or use smokeless tobacco.   Weight: 305 lbs    MEDICATIONS: No current medications, ALLERGIES: NKDA  Clinician Response:  Dear Belinda,   Your symptoms show that you may have coronavirus (COVID-19). This illness can cause fever, cough and trouble breathing. Many people get a mild case and get better on their own. Some people can get very sick.  What should I do?  We would like to test you for this virus.   1. Please call [phone] to schedule your visit. Explain that you were referred by [facility] to have a COVID-19 test. Be ready to share [facility] ID number.  * If you need to schedule in [facility] please call [phone] or for [employer] employees please call [phone].  * If you need to schedule in the [place] area please call [phone]. [place] employees call [phone].  The following will serve as your written order for this COVID Test, ordered by me, for the indication of suspected COVID [Z20.828]: The test will be ordered in [employer], our electronic health record, after you are scheduled. It will show as ordered and authorized by Albert Silva MD.  Order: COVID-19 (Coronavirus) PCR for SYMPTOMATIC testing [facility].   2. When it's time for your COVID test:  Stay at least 6 feet away from others. (If someone will drive you to your test, stay in the  "backseat, as far away from the  as you can.)   Cover your mouth and nose with a mask, tissue or washcloth.  Go straight to the testing site. Don't make any stops on the way there or back.      3.Starting now: Stay home and away from others (self-isolate) until:   You've had no fever---and no medicine that reduces fever---for one full day (24 hours). And...   Your other symptoms have gotten better. For example, your cough or breathing has improved. And...   At least 10 days have passed since your symptoms started.       During this time, don't leave the house except for testing or medical care.   Stay in your own room, even for meals. Use your own bathroom if you can.   Stay away from others in your home. No hugging, kissing or shaking hands. No visitors.  Don't go to work, school or anywhere else.    Clean \"high touch\" surfaces often (doorknobs, counters, handles, etc.). Use a household cleaning spray or wipes. You'll find a full list of  on the EPA website: www.epa.gov/pesticide-registration/list-n-disinfectants-use-against-sars-cov-2.   Cover your mouth and nose with a mask, tissue or washcloth to avoid spreading germs.  Wash your hands and face often. Use soap and water.  Caregivers in these groups are at risk for severe illness due to COVID-19:  o People 65 years and older  o People who live in a nursing home or long-term care facility  o People with chronic disease (lung, heart, cancer, diabetes, kidney, liver, immunologic)  o People who have a weakened immune system, including those who:   Are in cancer treatment  Take medicine that weakens the immune system, such as corticosteroids  Had a bone marrow or organ transplant  Have an immune deficiency  Have poorly controlled HIV or AIDS  Are obese (body mass index of 40 or higher)  Smoke regularly   o Caregivers should wear gloves while washing dishes, handling laundry and cleaning bedrooms and bathrooms.  o Use caution when washing and drying " laundry: Don't shake dirty laundry, and use the warmest water setting that you can.  o For more tips, go to www.cdc.gov/coronavirus/2019-ncov/downloads/10Things.pdf.    4.Sign up for [employer]. We know it's scary to hear that you might have COVID-19. We want to track your symptoms to make sure you're okay over the next 2 weeks. Please look for an email from [employer]---this is a free, online program that we'll use to keep in touch. To sign up, follow the link in the email. Learn more at [email or web address]  How can I take care of myself?   Get lots of rest. Drink extra fluids (unless a doctor has told you not to).   Take Tylenol (acetaminophen) for fever or pain. If you have liver or kidney problems, ask your family doctor if it's okay to take Tylenol.   Adults can take either:    650 mg (two 325 mg pills) every 4 to 6 hours, or...   1,000 mg (two 500 mg pills) every 8 hours as needed.    Note: Don't take more than 3,000 mg in one day. Acetaminophen is found in many medicines (both prescribed and over-the-counter medicines). Read all labels to be sure you don't take too much.   For children, check the Tylenol bottle for the right dose. The dose is based on the child's age or weight.    If you have other health problems (like cancer, heart failure, an organ transplant or severe kidney disease): Call your specialty clinic if you don't feel better in the next 2 days.       Know when to call 911. Emergency warning signs include:    Trouble breathing or shortness of breath Pain or pressure in the chest that doesn't go away Feeling confused like you haven't felt before, or not being able to wake up Bluish-colored lips or face.  Where can I get more information?    [facility] -- About COVID-19: [email or web address]   CDC -- What to Do If You're Sick: www.cdc.gov/coronavirus/2019-ncov/about/steps-when-sick.html   CDC -- Ending Home Isolation:  www.cdc.gov/coronavirus/2019-ncov/hcp/disposition-in-home-patients.html   [facility] -- Caring for Someone: www.cdc.gov/coronavirus/2019-ncov/if-you-are-sick/care-for-someone.html   [facility] -- Interim Guidance for Hospital Discharge to Home: www.[facility].mn.us/diseases/coronavirus/hcp/hospdischarge.pdf   [facility] clinical trials (COVID-19 research studies): clinicalaffairs.[facility]-clinical-trials    Below are the COVID-19 hotlines at the [place] [facility] ([facility]). Interpreters are available.    For health questions: Call 054-601-2838 or 1-642.404.6238 (7 a.m. to 7 p.m.) For questions about schools and childcare: Call 583-966-8919 or 1-315.372.8695 (7 a.m. to 7 p.m.)    Diagnosis: Cough  Diagnosis ICD: R05

## 2021-01-22 ENCOUNTER — VIRTUAL VISIT (OUTPATIENT)
Dept: FAMILY MEDICINE | Facility: OTHER | Age: 31
End: 2021-01-22

## 2021-01-23 NOTE — PROGRESS NOTES
"Date: 2021 19:48:15  Clinician: Didier Mims  Clinician NPI: [id number]  Patient: Belinda Castañeda  Patient : 1957  Patient Address: [address] [place], [place] [address]  Patient Phone: [phone]  Visit Protocol: URI  Patient Summary:  Belinda is a 63 year old ( : 1957 ) female who initiated a [facility] Visit for cold, sinus infection, or influenza. When asked the question \"Please sign me up to receive news, health information and promotions. \", Belinda responded \"No\".    Belinda states her symptoms started gradually 3-4 days ago.   Her symptoms consist of rhinitis. She is experiencing mild difficulty breathing with daily activities but can speak normally in full sentences.   Symptom details   Nasal secretions: The color of her mucus is clear.   Belinda denies having ear pain, headache, chills, vomiting, myalgias, malaise, fever, cough, nasal congestion, nausea, teeth pain, ageusia, diarrhea, wheezing, facial pain or pressure, anosmia, and sore throat. She also denies having recent facial or sinus surgery in the past 60 days, taking antibiotic medication in the past month, and double sickening (worsening symptoms after initial improvement).    Pertinent COVID-19 (Coronavirus) information  Belinda does not work or volunteer as healthcare worker or a . In the past 14 days, Belinda has not worked or volunteered at a healthcare facility or group living setting.   In the past 14 days, she also has not lived in a congregate living setting.   Belinda has not had a close contact with a laboratory-confirmed COVID-19 patient within 14 days of symptom onset.    Belinda has not been tested for COVID-19.   Belinda has not received a COVID-19 vaccine.   Pertinent medical history  She has not been told by her provider to avoid NSAIDs.   Belinda does not get yeast infections when she takes antibiotics.   Belinda does not have diabetes. She denies having immunosuppressive conditions (e.g., chemotherapy, HIV, organ transplant, " long-term use of steroids or other immunosuppressive medications, splenectomy). She denies having congestive heart failure and severe COPD. She does not have asthma.   Belinda needs a return to work/school note.   Belinda does not smoke or use smokeless tobacco.   Weight: 305 lbs    MEDICATIONS: No current medications, ALLERGIES: NKDA  Clinician Response:  Dear Belinda,  Based on the information provided, you have viral sinusitis, also known as a sinus infection. Sinus infections are caused by bacteria or a virus and symptoms are almost always identical. The difference between the 2 types of infections is timing.  Sinus infections start as viral infections and symptoms improve on their own in about 7 days. If symptoms have not improved after 7 days or have even worsened, a bacterial infection may have developed.  Medication information  Because you have a viral infection, antibiotics will not help you get better. Treating a viral infection with antibiotics could actually make you feel worse.  I am prescribing:     Fluticasone 50 mcg/actuation nasal spray. Inhale 2 sprays in each nostril 1 time per day; after 1 week, may adjust to 1 - 2 sprays in each nostril 1 time per day. This medication takes several days to start working, so keep taking it even if it doesn't help right away. There are no refills with this prescription.   Unless you are allergic to the over-the-counter medication(s) below, I recommend using:   A sinus irrigation kit such as Sinus Rinse, Neti Pot, SinuCleanse, or store brand. Be sure to use sterile or previously boiled water to prevent unwanted infections.   Over-the-counter medications do not require a prescription. Ask the pharmacist if you have any questions.  Self care  Steps you can take to be as comfortable as possible:     Rest.    Drink plenty of fluids.    Take a warm shower to loosen congestion    Use a cool-mist humidifier.     When to seek care  Please be seen in a clinic or urgent care if any  of the following occur:   New symptoms develop, or symptoms become worse   Call ahead before going to the clinic or urgent care.  For the latest updates on COVID-19 (Coronavirus), please visit the Centers for Disease Control and Prevention (CDC).   Diagnosis: Viral sinusitis  Diagnosis ICD: J01.90  Additional Clinician Notes:  If your symptoms are not improving or worsen, please go to one of our urgent care locations for evaluation and possible lab work.    Prescription: fluticasone 50 mcg/actuation nasal spray,suspension 1 120 spray aerosol with adapter (grams), 30 days supply. Inhale 2 sprays in each nostril 1 time per day; after 1 week, may adjust to 1 - 2 sprays in each nostril 1 time per day.. Refills: 0, Refill as needed: no, Allow substitutions: yes

## 2021-01-24 ENCOUNTER — VIRTUAL VISIT (OUTPATIENT)
Dept: FAMILY MEDICINE | Facility: OTHER | Age: 31
End: 2021-01-24

## 2021-01-24 NOTE — PROGRESS NOTES
"Date: 2021 14:24:26  Clinician: Jovita Trujillo  Clinician NPI: [id number]  Patient: Belinda Castañeda  Patient : 1957  Patient Address: [address] [place] [id number]  Patient Phone: [phone]  Visit Protocol: URI  Patient Summary:  Belinda is a 63 year old ( : 1957 ) female who initiated a [facility] Visit for COVID-19 (Coronavirus) evaluation and screening. When asked the question \"Please sign me up to receive news, health information and promotions. \", Belinda responded \"No\".    Belinda states her symptoms started suddenly 3-4 days ago.   Her symptoms consist of ear pain, a headache, chills, myalgia, rhinitis, malaise, a cough, nasal congestion, nausea, ageusia, diarrhea, anosmia, and a sore throat.   Symptom details     Nasal secretions: The color of her mucus is clear.    Cough: Belinda coughs a few times an hour and her cough is more bothersome at night. Phlegm does not come into her throat when she coughs. She does not believe her cough is caused by post-nasal drip.     Sore throat: Belinda reports having moderate throat pain (4-6 on a 10 point pain scale), does not have exudate on her tonsils, and can swallow liquids. The lymph nodes in her neck are not enlarged. A rash has not appeared on the skin since the sore throat started.     Headache: She states the headache is moderate (4-6 on a 10 point pain scale).      Belinda denies having vomiting, fever, enlarged lymph nodes, teeth pain, wheezing, and facial pain or pressure. She also denies having recent facial or sinus surgery in the past 60 days, taking antibiotic medication in the past month, and double sickening (worsening symptoms after initial improvement). She is not experiencing dyspnea.   Precipitating events  Within the past week, Belinda has not been exposed to someone with strep throat. She has not recently been exposed to someone with influenza. Belinda has not been in close contact with any high risk individuals.   Pertinent COVID-19 (Coronavirus) " information  Belinda does not work or volunteer as healthcare worker or a . In the past 14 days, Belinda has not worked or volunteered at a healthcare facility or group living setting.   In the past 14 days, she also has not lived in a congregate living setting.   Belinda has not had a close contact with a laboratory-confirmed COVID-19 patient within 14 days of symptom onset.    Belinda has been tested for COVID-19.      Date(s) of her COVID-19 test as reported by the patient (free text): 11/24/2020       Result of COVID-19 test as reported by the patient (free text): Negative       Type of test as reported by the patient (free text): Nasal        Belinda has not received a COVID-19 vaccine.   Pertinent medical history  She has not been told by her provider to avoid NSAIDs.   Belinda does not get yeast infections when she takes antibiotics.   Belinda does not have diabetes. She denies having immunosuppressive conditions (e.g., chemotherapy, HIV, organ transplant, long-term use of steroids or other immunosuppressive medications, splenectomy). She denies having congestive heart failure and severe COPD. She does not have asthma.   Belinda does not need a return to work/school note.   Belinda does not smoke or use smokeless tobacco.   Weight: 305 lbs    MEDICATIONS: No current medications, ALLERGIES: NKDA  Clinician Response:  Dear Belinda,   Your symptoms show that you may have coronavirus (COVID-19). This illness can cause fever, cough and trouble breathing. Many people get a mild case and get better on their own. Some people can get very sick.  Based on the symptoms you have shared, I would like you to be re-checked in 2 to 3 days. Please call your family clinic to set up a video or phone visit.  Will I be tested for COVID-19?  We would like to test you for this virus.   Please call [phone] to schedule your visit. Explain that you were referred by [facility] to have a COVID-19 test. Be ready to share your [facility] visit ID number.    "* If you need to schedule in [place] or [facility] please call [phone] or for [employer] employees please call [phone].    The following will serve as your written order for this COVID Test, ordered by me, for the indication of suspected COVID [Z20.828]: The test will be ordered in [employer], our electronic health record, after you are scheduled. It will show as ordered and authorized by Albert Silva MD.  Order: COVID-19 (Coronavirus) PCR for SYMPTOMATIC testing from [facility].   1.When it's time for your COVID test:   Stay at least 6 feet away from others. (If someone will drive you to your test, stay in the backseat, as far away from the  as you can.)   Cover your mouth and nose with a mask, tissue or washcloth.  Go straight to the testing site. Don't make any stops on the way there or back.      2.Starting now: Stay home and away from others (self-isolate) until:   You've had no fever---and no medicine that reduces fever---for one full day (24 hours). And...   Your other symptoms have gotten better. For example, your cough or breathing has improved. And...   At least 10 days have passed since your symptoms started.       During this time, don't leave the house except for testing or medical care.   Stay in your own room, even for meals. Use your own bathroom if you can.   Stay away from others in your home. No hugging, kissing or shaking hands. No visitors.  Don't go to work, school or anywhere else.    Clean \"high touch\" surfaces often (doorknobs, counters, handles, etc.). Use a household cleaning spray or wipes. You'll find a full list of  on the EPA website: www.epa.gov/pesticide-registration/list-n-disinfectants-use-against-sars-cov-2.   Cover your mouth and nose with a mask, tissue or washcloth to avoid spreading germs.  Wash your hands and face often. Use soap and water.  Caregivers in these groups are at risk for severe illness due to COVID-19:  o People 65 years and older  o People who " live in a nursing home or long-term care facility  o People with chronic disease (lung, heart, cancer, diabetes, kidney, liver, immunologic)   o People who have a weakened immune system, including those who:   Are in cancer treatment  Take medicine that weakens the immune system, such as corticosteroids  Had a bone marrow or organ transplant  Have an immune deficiency  Have poorly controlled HIV or AIDS  Are obese (body mass index of 40 or higher)  Smoke regularly   o Caregivers should wear gloves while washing dishes, handling laundry and cleaning bedrooms and bathrooms.  o Use caution when washing and drying laundry: Don't shake dirty laundry, and use the warmest water setting that you can.  o For more tips, go to www.cdc.gov/coronavirus/2019-ncov/downloads/10Things.pdf.      How can I take care of myself?   Get lots of rest. Drink extra fluids (unless a doctor has told you not to)   Take Tylenol (acetaminophen) for fever or pain. If you have liver or kidney problems, ask your family doctor if it's okay to take Tylenol.   Adults can take either:    650 mg (two 325 mg pills) every 4 to 6 hours, or...   1,000 mg (two 500 mg pills) every 8 hours as needed.    Note: Don't take more than 3,000 mg in one day. Acetaminophen is found in many medicines (both prescribed and over-the-counter medicines). Read all labels to be sure you don't take too much.   For children, check the Tylenol bottle for the right dose. The dose is based on the child's age or weight.    If you have other health problems (like cancer, heart failure, an organ transplant or severe kidney disease): Call your specialty clinic if you don't feel better in the next 2 days.       Know when to call 911. Emergency warning signs include:    Trouble breathing or shortness of breath Pain or pressure in the chest that doesn't go away Feeling confused like you haven't felt before, or not being able to wake up Bluish-colored lips or face  Where can I get more  information?   [facility] -- About COVID-19: [email or web address]   CDC -- What to Do If You're Sick: www.cdc.gov/coronavirus/2019-ncov/about/steps-when-sick.html   [facility] -- Ending Home Isolation: www.cdc.gov/coronavirus/2019-ncov/hcp/disposition-in-home-patients.html   [facility] -- Caring for Someone: www.cdc.gov/coronavirus/2019-ncov/if-you-are-sick/care-for-someone.html   [facility] -- Interim Guidance for Hospital Discharge to Home: www.[facility].mn./diseases/coronavirus/hcp/hospdischarge.pdf   [facility] clinical trials (COVID-19 research studies): [email or web address]-clinical-trials    Below are the COVID-19 hotlines at the [place] [facility] ([facility]). Interpreters are available.    For health questions: Call 256-599-2202 or 1-978.349.3629 (7 a.m. to 7 p.m.) For questions about schools and childcare: Call 228-013-3315 or 1-238.233.4479 (7 a.m. to 7 p.m.)       Diagnosis: Contact with and (suspected) exposure to other viral communicable diseases  Diagnosis ICD: Z20.828